# Patient Record
Sex: FEMALE | Race: BLACK OR AFRICAN AMERICAN | Employment: FULL TIME | URBAN - METROPOLITAN AREA
[De-identification: names, ages, dates, MRNs, and addresses within clinical notes are randomized per-mention and may not be internally consistent; named-entity substitution may affect disease eponyms.]

---

## 2017-01-06 ENCOUNTER — OFFICE VISIT (OUTPATIENT)
Dept: FAMILY MEDICINE CLINIC | Age: 27
End: 2017-01-06

## 2017-01-06 VITALS
OXYGEN SATURATION: 99 % | SYSTOLIC BLOOD PRESSURE: 110 MMHG | HEIGHT: 64 IN | BODY MASS INDEX: 26.19 KG/M2 | RESPIRATION RATE: 16 BRPM | HEART RATE: 83 BPM | DIASTOLIC BLOOD PRESSURE: 80 MMHG | WEIGHT: 153.4 LBS | TEMPERATURE: 98.2 F

## 2017-01-06 DIAGNOSIS — Z13.220 SCREENING CHOLESTEROL LEVEL: ICD-10-CM

## 2017-01-06 DIAGNOSIS — Z13.21 ENCOUNTER FOR VITAMIN DEFICIENCY SCREENING: ICD-10-CM

## 2017-01-06 DIAGNOSIS — Z13.29 SCREENING FOR THYROID DISORDER: ICD-10-CM

## 2017-01-06 DIAGNOSIS — Z00.00 ANNUAL PHYSICAL EXAM: Primary | ICD-10-CM

## 2017-01-06 DIAGNOSIS — F41.9 ANXIETY AND DEPRESSION: ICD-10-CM

## 2017-01-06 DIAGNOSIS — F32.A ANXIETY AND DEPRESSION: ICD-10-CM

## 2017-01-06 RX ORDER — BUSPIRONE HYDROCHLORIDE 10 MG/1
10 TABLET ORAL 3 TIMES DAILY
Qty: 90 TAB | Refills: 0 | Status: SHIPPED | OUTPATIENT
Start: 2017-01-06

## 2017-01-06 RX ORDER — BUSPIRONE HYDROCHLORIDE 10 MG/1
10 TABLET ORAL 3 TIMES DAILY
COMMUNITY
End: 2017-01-06 | Stop reason: SDUPTHER

## 2017-01-06 RX ORDER — OLANZAPINE 5 MG/1
5 TABLET ORAL
Qty: 30 TAB | Refills: 0 | Status: SHIPPED | OUTPATIENT
Start: 2017-01-06

## 2017-01-06 RX ORDER — FLUOXETINE HYDROCHLORIDE 20 MG/1
CAPSULE ORAL DAILY
COMMUNITY

## 2017-01-06 RX ORDER — OLANZAPINE 5 MG/1
TABLET ORAL
COMMUNITY
End: 2017-01-06 | Stop reason: SDUPTHER

## 2017-01-06 NOTE — MR AVS SNAPSHOT
Visit Information Date & Time Provider Department Dept. Phone Encounter #  
 1/6/2017 11:15 AM Addison Mays MD [facility] at [address] [place] [address] [id number] Follow-up Instructions Return 1-2 weeks, for f/u results. Upcoming Health Maintenance Date Due  
 HPV AGE 9Y-34Y (1 of 3 - Female 3 Dose Series) 3/28/2001 Pneumococcal 19-64 Medium Risk (1 of 1 - PPSV23) 3/28/2009 DTaP/Tdap/Td series (1 - Tdap) 3/28/2011 PAP AKA CERVICAL CYTOLOGY 3/28/2011 INFLUENZA AGE 9 TO ADULT 8/1/2016 Allergies as of 1/6/2017  Review Complete On: 1/6/2017 By: Addison Mays MD  
 No Known Allergies Current Immunizations  Never Reviewed No immunizations on file. Not reviewed this visit You Were Diagnosed With   
  
 Codes Comments Annual physical exam    -  Primary ICD-10-CM: Z00.00 ICD-9-CM: V70.0 Screening for thyroid disorder     ICD-10-CM: Z13.29 ICD-9-CM: V77.0 Screening cholesterol level     ICD-10-CM: A86.759 ICD-9-CM: V77.91 Encounter for vitamin deficiency screening     ICD-10-CM: Z13.21 ICD-9-CM: V77.99 Anxiety and depression     ICD-10-CM: F41.9, F32.9 ICD-9-CM: 300.00, 311 Vitals BP Pulse Temp Resp Height(growth percentile) Weight(growth percentile) 110/80 (BP 1 Location: Left arm, BP Patient Position: Sitting) 83 98.2 °F (36.8 °C) (Oral) 16 5' 4\" (1.626 m) 153 lb 6.4 oz (69.6 kg) LMP SpO2 BMI Smoking Status 12/02/2016 99% 26.33 kg/m2 Former Smoker Vitals History BMI and BSA Data Body Mass Index Body Surface Area  
 26.33 kg/m 2 1.77 m 2 Preferred Pharmacy Pharmacy Name Phone [id number] [address] [phone] Your Updated Medication List  
  
   
This list is accurate as of: 1/6/17 12:53 PM.  Always use your most recent med list.  
  
  
  
  
 busPIRone 10 mg tablet Commonly known as:  BUSPAR  
 Take 1 Tab by mouth three (3) times daily. FLUoxetine 20 mg capsule Commonly known as:  PROzac Take  by mouth daily. OLANZapine 5 mg tablet Commonly known as:  ZyPREXA Take 1 Tab by mouth nightly. ondansetron 4 mg disintegrating tablet Commonly known as:  ZOFRAN ODT Take 1 Tab by mouth every eight (8) hours as needed for Nausea. STRATTERA PO Take 80 mg by mouth daily. Prescriptions Sent to Pharmacy Refills OLANZapine (ZYPREXA) 5 mg tablet 0 Sig: Take 1 Tab by mouth nightly. Class: Normal  
 Pharmacy: [address] [place] [address] Ph #: [phone] Route: Oral  
 busPIRone (BUSPAR) 10 mg tablet 0 Sig: Take 1 Tab by mouth three (3) times daily. Class: Normal  
 Pharmacy: [address] [place] [address] Ph #: [phone] Route: Oral  
  
We Performed the Following CBC W/O DIFF [79328 CPT(R)] LIPID PANEL [42975 CPT(R)] METABOLIC PANEL, COMPREHENSIVE [64494 CPT(R)] TSH 3RD GENERATION [98457 CPT(R)] URINALYSIS W/ RFLX MICROSCOPIC [55160 CPT(R)] VITAMIN D, 25 HYDROXY [id number] CPT(R)] Follow-up Instructions Return 1-2 weeks, for f/u results. Patient Instructions Anxiety Disorder: Care Instructions Your Care Instructions Anxiety is a normal reaction to stress. Difficult situations can cause you to have symptoms such as sweaty palms and a nervous feeling. In an anxiety disorder, the symptoms are far more severe. Constant worry, muscle tension, trouble sleeping, nausea and diarrhea, and other symptoms can make normal daily activities difficult or impossible. These symptoms may occur for no reason, and they can affect your work, school, or social life. Medicines, counseling, and self-care can all help. Follow-up care is a key part of your treatment and safety.  Be sure to make and go to all appointments, and call your doctor if you are having problems. It's also a good idea to know your test results and keep a list of the medicines you take. How can you care for yourself at home? · Take medicines exactly as directed. Call your doctor if you think you are having a problem with your medicine. · Go to your counseling sessions and follow-up appointments. · Recognize and accept your anxiety. Then, when you are in a situation that makes you anxious, say to yourself, \"This is not an emergency. I feel uncomfortable, but I am not in danger. I can keep going even if I feel anxious. \" · Be kind to your body: ¨ Relieve tension with exercise or a massage. ¨ Get enough rest. 
¨ Avoid alcohol, caffeine, nicotine, and illegal drugs. They can increase your anxiety level and cause sleep problems. ¨ Learn and do relaxation techniques. See below for more about these techniques. · Engage your mind. Get out and do something you enjoy. Go to a funny movie, or take a walk or hike. Plan your day. Having too much or too little to do can make you anxious. · Keep a record of your symptoms. Discuss your fears with a good friend or family member, or join a support group for people with similar problems. Talking to others sometimes relieves stress. · Get involved in social groups, or volunteer to help others. Being alone sometimes makes things seem worse than they are. · Get at least 30 minutes of exercise on most days of the week to relieve stress. Walking is a good choice. You also may want to do other activities, such as running, swimming, cycling, or playing tennis or team sports. Relaxation techniques Do relaxation exercises 10 to 20 minutes a day. You can play soothing, relaxing music while you do them, if you wish. · Tell others in your house that you are going to do your relaxation exercises. Ask them not to disturb you. · Find a comfortable place, away from all distractions and noise. · Lie down on your back, or sit with your back straight. · Focus on your breathing. Make it slow and steady. · Breathe in through your nose. Breathe out through either your nose or mouth. · Breathe deeply, filling up the area between your navel and your rib cage. Breathe so that your belly goes up and down. · Do not hold your breath. · Breathe like this for 5 to 10 minutes. Notice the feeling of calmness throughout your whole body. As you continue to breathe slowly and deeply, relax by doing the following for another 5 to 10 minutes: · Tighten and relax each muscle group in your body. You can begin at your toes and work your way up to your head. · Imagine your muscle groups relaxing and becoming heavy. · Empty your mind of all thoughts. · Let yourself relax more and more deeply. · Become aware of the state of calmness that surrounds you. · When your relaxation time is over, you can bring yourself back to alertness by moving your fingers and toes and then your hands and feet and then stretching and moving your entire body. Sometimes people fall asleep during relaxation, but they usually wake up shortly afterward. · Always give yourself time to return to full alertness before you drive a car or do anything that might cause an accident if you are not fully alert. Never play a relaxation tape while you drive a car. When should you call for help? Call 911 anytime you think you may need emergency care. For example, call if: 
· You feel you cannot stop from hurting yourself or someone else. Keep the numbers for these national suicide hotlines: 3-007-467-TALK (1-441.232.5280) and 9-299-CFSDWAT (3-800.601.8164). If you or someone you know talks about suicide or feeling hopeless, get help right away. Watch closely for changes in your health, and be sure to contact your doctor if: 
· You have anxiety or fear that affects your life.  
· You have symptoms of anxiety that are new or different from those you had before. Where can you learn more? Go to http://gustavo-candi.info/. Enter P754 in the search box to learn more about \"Anxiety Disorder: Care Instructions. \" Current as of: July 26, 2016 Content Version: 11.1 © 6818-1175 [employer]. Care instructions adapted under license by [employer] (which disclaims liability or warranty for this information). If you have questions about a medical condition or this instruction, always ask your healthcare professional. [address] any warranty or liability for your use of this information. Introducing [facility]! [facility] introduces [employer] patient portal. Now you can access parts of your medical record, email your doctor's office, and request medication refills online. 1. In your internet browser, go to [email or web address] 2. Click on the First Time User? Click Here link in the Sign In box. You will see the New Member Sign Up page. 3. Enter your [employer] Access Code exactly as it appears below. You will not need to use this code after youve completed the sign-up process. If you do not sign up before the expiration date, you must request a new code. · [employer] Access Code: [email or web address] Expires: 4/6/2017 12:53 PM 
 
4. Enter the last four digits of your Social Security Number (xxxx) and Date of Birth (mm/dd/yyyy) as indicated and click Submit. You will be taken to the next sign-up page. 5. Create a [employer] ID. This will be your [employer] login ID and cannot be changed, so think of one that is secure and easy to remember. 6. Create a [employer] password. You can change your password at any time. 7. Enter your Password Reset Question and Answer. This can be used at a later time if you forget your password. 8. Enter your e-mail address. You will receive e-mail notification when new information is available in [address] 9. Click Sign Up. You can now view and download portions of your medical record. 10. Click the Download Summary menu link to download a portable copy of your medical information. If you have questions, please visit the Frequently Asked Questions section of the [employer] website. Remember, [employer] is NOT to be used for urgent needs. For medical emergencies, dial 911. Now available from your iPhone and Android! Please provide this summary of care documentation to your next provider. Your primary care clinician is listed as NONE. If you have any questions after today's visit, please call [phone].

## 2017-01-06 NOTE — PATIENT INSTRUCTIONS

## 2017-01-06 NOTE — PROGRESS NOTES
Chief Complaint   Patient presents with    New Patient     establish care     HPI:  Bogdan Rogers is a 32 y.o. AA female with h/o ADHA, anxiety and depression presenting to establish care requesting refill on psychotropic medications. Patient is on 4 different medications as noted below. Spoke to her pharmacist, Oscar Avilez who confirmed the medications and dosages. She will make available Strattera that former pcp ordered  for patient to . Meanwhile, she will have her annual physical.  I have give a list psychiatrist for her to begin to find  a new specialist.    Review of Systems  Constitutional: negative for fevers/chills, anorexia and weight loss  Eyes:   negative for visual disturbance   Respiratory:  negative for cough, dyspnea,wheezing  CV:   negative for chest pain, palpitations, lower extremity edema  GI:   negative for nausea, vomiting, diarrhea, abdominal pain  Endo:               negative for polyuria,polydipsia,polyphagia,heat intolerance  Genitourinary: negative for frequency, dysuria   Neurological:  negative for headaches, dizziness  Behavl/Psych: positive for feelings of anxiety and depression    Past Medical History   Diagnosis Date    ADHD (attention deficit hyperactivity disorder)     Depression      History reviewed. No pertinent past surgical history. Social History    Marital status: SINGLE     Spouse name: N/A    Number of children: N/A    Years of education: N/A     Social History Main Topics    Smoking status: Former Smoker    Smokeless tobacco: None    Alcohol use No    Drug use: No    Sexual activity: Yes     Partners: Male     Birth control/ protection: Condom     Family History   Problem Relation Age of Onset    Elevated Lipids Mother     Hypertension Mother     Stroke Paternal Aunt     Cancer Maternal Grandmother      Current Outpatient Prescriptions   Medication Sig Dispense Refill    OLANZapine (ZYPREXA) 5 mg tablet Take  by mouth nightly.       FLUoxetine (PROZAC) 20 mg capsule Take  by mouth daily.  busPIRone (BUSPAR) 10 mg tablet Take 10 mg by mouth three (3) times daily.  ATOMOXETINE HCL (STRATTERA PO) Take 80 mg by mouth daily.  ondansetron (ZOFRAN ODT) 4 mg disintegrating tablet Take 1 Tab by mouth every eight (8) hours as needed for Nausea. 10 Tab 0     No Known Allergies    Objective:  Visit Vitals    /80 (BP 1 Location: Left arm, BP Patient Position: Sitting)    Pulse 83    Temp 98.2 °F (36.8 °C) (Oral)    Resp 16    Ht 5' 4\" (1.626 m)    Wt 153 lb 6.4 oz (69.6 kg)    LMP 12/02/2016    SpO2 99%    BMI 26.33 kg/m2     Physical Exam:   General appearance - alert, well appearing, in no distress  Mental status - alert, oriented to person, place, and time  EYE-PERRL, EOMI  Mouth -good oral hygiene, mucous membranes moist  Neck - supple, no thyromegaly    Chest - clear to auscultation, no wheezes, rales or rhonchi  Heart - normal rate, regular rhythm, normal blood pressure  Abdomen - soft, nontender, nondistended, no masses or organomegaly  Ext-peripheral pulses normal, no pedal edema  Neuro -alert, oriented, normal speech, no focal findings    Results for orders placed or performed during the hospital encounter of 07/20/16   CBC WITH AUTOMATED DIFF   Result Value Ref Range    WBC 21.3 (H) 3.6 - 11.0 K/uL    RBC 3.63 (L) 3.80 - 5.20 M/uL    HGB 12.3 11.5 - 16.0 g/dL    HCT 34.7 (L) 35.0 - 47.0 %    MCV 95.6 80.0 - 99.0 FL    MCH 33.9 26.0 - 34.0 PG    MCHC 35.4 30.0 - 36.5 g/dL    RDW 12.5 11.5 - 14.5 %    PLATELET 391 880 - 315 K/uL    NEUTROPHILS 89 (H) 32 - 75 %    BANDS 7 (H) 0 - 6 %    LYMPHOCYTES 2 (L) 12 - 49 %    MONOCYTES 2 (L) 5 - 13 %    EOSINOPHILS 0 0 - 7 %    BASOPHILS 0 0 - 1 %    ABS. NEUTROPHILS 20.5 (H) 1.8 - 8.0 K/UL    ABS. LYMPHOCYTES 0.4 (L) 0.8 - 3.5 K/UL    ABS. MONOCYTES 0.4 0.0 - 1.0 K/UL    ABS. EOSINOPHILS 0.0 0.0 - 0.4 K/UL    ABS.  BASOPHILS 0.0 0.0 - 0.1 K/UL    RBC COMMENTS NORMOCYTIC, NORMOCHROMIC      DF MANUAL     METABOLIC PANEL, COMPREHENSIVE   Result Value Ref Range    Sodium 134 (L) 136 - 145 mmol/L    Potassium 3.5 3.5 - 5.1 mmol/L    Chloride 99 97 - 108 mmol/L    CO2 25 21 - 32 mmol/L    Anion gap 10 5 - 15 mmol/L    Glucose 110 (H) 65 - 100 mg/dL    BUN 10 6 - 20 MG/DL    Creatinine 0.69 0.55 - 1.02 MG/DL    BUN/Creatinine ratio 14 12 - 20      GFR est AA >60 >60 ml/min/1.73m2    GFR est non-AA >60 >60 ml/min/1.73m2    Calcium 8.5 8.5 - 10.1 MG/DL    Bilirubin, total 0.5 0.2 - 1.0 MG/DL    ALT 23 12 - 78 U/L    AST 25 15 - 37 U/L    Alk. phosphatase 70 45 - 117 U/L    Protein, total 7.9 6.4 - 8.2 g/dL    Albumin 3.6 3.5 - 5.0 g/dL    Globulin 4.3 (H) 2.0 - 4.0 g/dL    A-G Ratio 0.8 (L) 1.1 - 2.2     LIPASE   Result Value Ref Range    Lipase 71 (L) 73 - 393 U/L   URINALYSIS W/MICROSCOPIC   Result Value Ref Range    Color YELLOW/STRAW      Appearance CLEAR CLEAR      Specific gravity 1.022 1.003 - 1.030      pH (UA) 5.5 5.0 - 8.0      Protein NEGATIVE  NEG mg/dL    Glucose NEGATIVE  NEG mg/dL    Ketone 40 (A) NEG mg/dL    Bilirubin NEGATIVE  NEG      Blood NEGATIVE  NEG      Urobilinogen 0.2 0.2 - 1.0 EU/dL    Nitrites NEGATIVE  NEG      Leukocyte Esterase NEGATIVE  NEG      WBC 0-4 0 - 4 /hpf    RBC 0-5 0 - 5 /hpf    Epithelial cells FEW FEW /lpf    Bacteria NEGATIVE  NEG /hpf    Mucus TRACE (A) NEG /lpf   HCG URINE, QL   Result Value Ref Range    HCG urine, Ql. NEGATIVE  NEG       Assessment/Plan:    ICD-10-CM ICD-9-CM    1. Annual physical exam Z00.00 V70.0 CBC W/O DIFF      URINALYSIS W/ RFLX MICROSCOPIC      METABOLIC PANEL, COMPREHENSIVE   2. Screening for thyroid disorder Z13.29 V77.0 TSH 3RD GENERATION   3. Screening cholesterol level Z13.220 V77.91 LIPID PANEL   4. Encounter for vitamin deficiency screening Z13.21 V77.99 VITAMIN D, 25 HYDROXY   5.  Anxiety and depression F41.9 300.00 OLANZapine (ZYPREXA) 5 mg tablet    F32.9 311 busPIRone (BUSPAR) 10 mg tablet     Patient Instructions        Anxiety Disorder: Care Instructions  Your Care Instructions  Anxiety is a normal reaction to stress. Difficult situations can cause you to have symptoms such as sweaty palms and a nervous feeling. In an anxiety disorder, the symptoms are far more severe. Constant worry, muscle tension, trouble sleeping, nausea and diarrhea, and other symptoms can make normal daily activities difficult or impossible. These symptoms may occur for no reason, and they can affect your work, school, or social life. Medicines, counseling, and self-care can all help. Follow-up care is a key part of your treatment and safety. Be sure to make and go to all appointments, and call your doctor if you are having problems. It's also a good idea to know your test results and keep a list of the medicines you take. How can you care for yourself at home? · Take medicines exactly as directed. Call your doctor if you think you are having a problem with your medicine. · Go to your counseling sessions and follow-up appointments. · Recognize and accept your anxiety. Then, when you are in a situation that makes you anxious, say to yourself, \"This is not an emergency. I feel uncomfortable, but I am not in danger. I can keep going even if I feel anxious. \"  · Be kind to your body:  ¨ Relieve tension with exercise or a massage. ¨ Get enough rest.  ¨ Avoid alcohol, caffeine, nicotine, and illegal drugs. They can increase your anxiety level and cause sleep problems. ¨ Learn and do relaxation techniques. See below for more about these techniques. · Engage your mind. Get out and do something you enjoy. Go to a funny movie, or take a walk or hike. Plan your day. Having too much or too little to do can make you anxious. · Keep a record of your symptoms. Discuss your fears with a good friend or family member, or join a support group for people with similar problems. Talking to others sometimes relieves stress.   · Get involved in social groups, or volunteer to help others. Being alone sometimes makes things seem worse than they are. · Get at least 30 minutes of exercise on most days of the week to relieve stress. Walking is a good choice. You also may want to do other activities, such as running, swimming, cycling, or playing tennis or team sports. Relaxation techniques  Do relaxation exercises 10 to 20 minutes a day. You can play soothing, relaxing music while you do them, if you wish. · Tell others in your house that you are going to do your relaxation exercises. Ask them not to disturb you. · Find a comfortable place, away from all distractions and noise. · Lie down on your back, or sit with your back straight. · Focus on your breathing. Make it slow and steady. · Breathe in through your nose. Breathe out through either your nose or mouth. · Breathe deeply, filling up the area between your navel and your rib cage. Breathe so that your belly goes up and down. · Do not hold your breath. · Breathe like this for 5 to 10 minutes. Notice the feeling of calmness throughout your whole body. As you continue to breathe slowly and deeply, relax by doing the following for another 5 to 10 minutes:  · Tighten and relax each muscle group in your body. You can begin at your toes and work your way up to your head. · Imagine your muscle groups relaxing and becoming heavy. · Empty your mind of all thoughts. · Let yourself relax more and more deeply. · Become aware of the state of calmness that surrounds you. · When your relaxation time is over, you can bring yourself back to alertness by moving your fingers and toes and then your hands and feet and then stretching and moving your entire body. Sometimes people fall asleep during relaxation, but they usually wake up shortly afterward. · Always give yourself time to return to full alertness before you drive a car or do anything that might cause an accident if you are not fully alert.  Never play a relaxation tape while you drive a car. When should you call for help? Call 911 anytime you think you may need emergency care. For example, call if:  · You feel you cannot stop from hurting yourself or someone else. Keep the numbers for these national suicide hotlines: 2-128-837-TALK (8-934.842.9809) and 4-646-BZFATUF (3-529.931.5088). If you or someone you know talks about suicide or feeling hopeless, get help right away. Watch closely for changes in your health, and be sure to contact your doctor if:  · You have anxiety or fear that affects your life. · You have symptoms of anxiety that are new or different from those you had before. Where can you learn more? Go to http://gustavo-candi.info/. Enter P754 in the search box to learn more about \"Anxiety Disorder: Care Instructions. \"  Current as of: July 26, 2016  Content Version: 11.1  © 8674-2909 [employer]. Care instructions adapted under license by [employer] (which disclaims liability or warranty for this information). If you have questions about a medical condition or this instruction, always ask your healthcare professional. [place] [address] any warranty or liability for your use of this information. Follow-up Disposition:  Return 1-2 weeks, for f/u results.

## 2019-01-07 ENCOUNTER — HOSPITAL ENCOUNTER (EMERGENCY)
Age: 29
Discharge: LEFT BEFORE DISCHARGE | End: 2019-01-07
Attending: EMERGENCY MEDICINE
Payer: COMMERCIAL

## 2019-01-07 VITALS
WEIGHT: 142.2 LBS | SYSTOLIC BLOOD PRESSURE: 123 MMHG | HEIGHT: 64 IN | OXYGEN SATURATION: 100 % | TEMPERATURE: 98.1 F | HEART RATE: 86 BPM | DIASTOLIC BLOOD PRESSURE: 84 MMHG | BODY MASS INDEX: 24.28 KG/M2 | RESPIRATION RATE: 14 BRPM

## 2019-01-07 PROCEDURE — 75810000275 HC EMERGENCY DEPT VISIT NO LEVEL OF CARE

## 2019-09-25 PROBLEM — Z00.00 ANNUAL PHYSICAL EXAM: Status: RESOLVED | Noted: 2017-01-06 | Resolved: 2019-09-25
